# Patient Record
Sex: MALE | Race: WHITE
[De-identification: names, ages, dates, MRNs, and addresses within clinical notes are randomized per-mention and may not be internally consistent; named-entity substitution may affect disease eponyms.]

---

## 2018-10-04 ENCOUNTER — HOSPITAL ENCOUNTER (INPATIENT)
Dept: HOSPITAL 12 - ER | Age: 73
Discharge: ANOTHER HOSPITAL | DRG: 281 | End: 2018-10-04
Attending: INTERNAL MEDICINE
Payer: MEDICARE

## 2018-10-04 VITALS — DIASTOLIC BLOOD PRESSURE: 43 MMHG | SYSTOLIC BLOOD PRESSURE: 100 MMHG

## 2018-10-04 VITALS — SYSTOLIC BLOOD PRESSURE: 108 MMHG | DIASTOLIC BLOOD PRESSURE: 60 MMHG

## 2018-10-04 VITALS — DIASTOLIC BLOOD PRESSURE: 59 MMHG | SYSTOLIC BLOOD PRESSURE: 106 MMHG

## 2018-10-04 VITALS — WEIGHT: 174 LBS | BODY MASS INDEX: 22.33 KG/M2 | HEIGHT: 74 IN

## 2018-10-04 VITALS — DIASTOLIC BLOOD PRESSURE: 61 MMHG | SYSTOLIC BLOOD PRESSURE: 106 MMHG

## 2018-10-04 DIAGNOSIS — N40.0: ICD-10-CM

## 2018-10-04 DIAGNOSIS — I25.10: ICD-10-CM

## 2018-10-04 DIAGNOSIS — K44.9: ICD-10-CM

## 2018-10-04 DIAGNOSIS — Z82.49: ICD-10-CM

## 2018-10-04 DIAGNOSIS — E78.5: ICD-10-CM

## 2018-10-04 DIAGNOSIS — K86.2: ICD-10-CM

## 2018-10-04 DIAGNOSIS — E87.6: ICD-10-CM

## 2018-10-04 DIAGNOSIS — K21.0: ICD-10-CM

## 2018-10-04 DIAGNOSIS — I21.4: Primary | ICD-10-CM

## 2018-10-04 LAB
ALP SERPL-CCNC: 55 U/L (ref 50–136)
ALT SERPL W/O P-5'-P-CCNC: 23 U/L (ref 16–63)
AST SERPL-CCNC: 17 U/L (ref 15–37)
BASOPHILS # BLD AUTO: 0 K/UL (ref 0–8)
BASOPHILS NFR BLD AUTO: 0.4 % (ref 0–2)
BILIRUB DIRECT SERPL-MCNC: 0.2 MG/DL (ref 0–0.2)
BILIRUB SERPL-MCNC: 0.8 MG/DL (ref 0.2–1)
BUN SERPL-MCNC: 12 MG/DL (ref 7–18)
CHLORIDE SERPL-SCNC: 103 MMOL/L (ref 98–107)
CHOLEST SERPL-MCNC: 203 MG/DL (ref ?–200)
CO2 SERPL-SCNC: 24 MMOL/L (ref 21–32)
CREAT SERPL-MCNC: 1 MG/DL (ref 0.6–1.3)
EOSINOPHIL # BLD AUTO: 0.4 K/UL (ref 0–0.7)
EOSINOPHIL NFR BLD AUTO: 4.1 % (ref 0–7)
GLUCOSE SERPL-MCNC: 109 MG/DL (ref 74–106)
HCT VFR BLD AUTO: 46.5 % (ref 36.7–47.1)
HDLC SERPL-MCNC: 49 MG/DL (ref 40–60)
HGB BLD-MCNC: 16.3 G/DL (ref 12.5–16.3)
LYMPHOCYTES # BLD AUTO: 2.5 K/UL (ref 20–40)
LYMPHOCYTES NFR BLD AUTO: 26.9 % (ref 20.5–51.5)
MCH RBC QN AUTO: 32.9 UUG (ref 23.8–33.4)
MCHC RBC AUTO-ENTMCNC: 35 G/DL (ref 32.5–36.3)
MCV RBC AUTO: 93.8 FL (ref 73–96.2)
MONOCYTES # BLD AUTO: 1 K/UL (ref 2–10)
MONOCYTES NFR BLD AUTO: 10.8 % (ref 0–11)
NEUTROPHILS # BLD AUTO: 5.4 K/UL (ref 1.8–8.9)
NEUTROPHILS NFR BLD AUTO: 57.8 % (ref 38.5–71.5)
PLATELET # BLD AUTO: 146 K/UL (ref 152–348)
POTASSIUM SERPL-SCNC: 3.3 MMOL/L (ref 3.5–5.1)
RBC # BLD AUTO: 4.95 MIL/UL (ref 4.06–5.63)
TRIGL SERPL-MCNC: 67 MG/DL (ref 30–150)
TSH SERPL DL<=0.005 MIU/L-ACNC: 0.88 MIU/ML (ref 0.36–3.74)
WBC # BLD AUTO: 9.3 K/UL (ref 3.6–10.2)
WS STN SPEC: 7 G/DL (ref 6.4–8.2)

## 2018-10-04 PROCEDURE — A4663 DIALYSIS BLOOD PRESSURE CUFF: HCPCS

## 2018-10-04 RX ADMIN — ATORVASTATIN CALCIUM SCH MG: 40 TABLET, FILM COATED ORAL at 12:12

## 2018-10-04 RX ADMIN — ATORVASTATIN CALCIUM SCH MG: 40 TABLET, FILM COATED ORAL at 20:09

## 2018-10-04 NOTE — NUR
PATIENT IS CURRENTLY IN BED, NO DISTRESS NOTED AT THIS TIME, [facility] CALLED TO ACCEPT PATIENT 
AND AMBULANCE BEING CALLED FOR 2030  TIME,  COPY OF IMAGING PLACED IN TRANSPORT 
PAPERWORK.. PATIENT IS TO BE DISCHARGED WITH HEPARIN DRIP.

## 2018-10-04 NOTE — NUR
PATIENT ANXIOUS AND NERVOUS WHEN  BEING TRANSFERRED TO [facility]. C/O CHEST PAIN 8/10, MORPHINE 
2MG PRN GIVEN AS ORDERED WITH EFFECT. V/S STABLE ON DISCHARGE /74, OR 63 0N MONITOR, 
RR 18, ON O2 AT 2LPM SAT 98%.

## 2018-10-04 NOTE — NUR
RECEIVED REPORT FROM NIGHT SHIFT NURSE, PATIENT IN BED, NO DISTRESS NOTED AT THIS TIME, BED 
IN LOW POSITION, SIDE RAILS UP X2. BED ALARM ON.

## 2018-10-04 NOTE — NUR
DISCHARGE  PATIENT TO [facility]. REPORT ENDORSED TO SAM PERALTA/S STABLE NO CHEST PAIN AT 
PRESENT

## 2018-10-04 NOTE — NUR
NOTIFIED DR. WATSON THAT CRITICAL VALUES RECEIVED FOR TROPONIN. ADDITIONALLY CALLED 
DR. MERA AND RECEIVED ORDERS FOR HEPARIN, METOPROLOL, AND LIPITOR.

## 2023-09-19 NOTE — NUR
See other phone message from 09/19/23   VERBAL ORDER RECEIVED FROM DR. HILLS FOR DIANEUR 20MEQ PO FOR POTASSIUM 3.3.